# Patient Record
Sex: FEMALE | Race: WHITE | NOT HISPANIC OR LATINO | URBAN - METROPOLITAN AREA
[De-identification: names, ages, dates, MRNs, and addresses within clinical notes are randomized per-mention and may not be internally consistent; named-entity substitution may affect disease eponyms.]

---

## 2013-03-12 RX ORDER — TRIAMCINOLONE ACETONIDE 1 MG/G
CREAM TOPICAL
Qty: 454 | Refills: 6
Start: 2013-03-12

## 2019-11-12 ENCOUNTER — COMPLETE SKIN EXAM (OUTPATIENT)
Dept: URBAN - METROPOLITAN AREA CLINIC 18 | Facility: CLINIC | Age: 74
Setting detail: DERMATOLOGY
End: 2019-11-12

## 2019-11-12 DIAGNOSIS — C44.42 SQUAMOUS CELL CARCINOMA OF SKIN OF SCALP AND NECK: ICD-10-CM

## 2019-11-12 PROBLEM — L82.1 OTHER SEBORRHEIC KERATOSIS: Status: RESOLVED | Noted: 2019-11-12

## 2019-11-12 PROBLEM — L85.3 XEROSIS CUTIS: Status: RESOLVED | Noted: 2019-11-12

## 2019-11-12 PROCEDURE — 99213 OFFICE O/P EST LOW 20 MIN: CPT

## 2020-09-21 ENCOUNTER — FOLLOW-UP (OUTPATIENT)
Dept: URBAN - METROPOLITAN AREA CLINIC 18 | Facility: CLINIC | Age: 75
Setting detail: DERMATOLOGY
End: 2020-09-21

## 2020-09-21 DIAGNOSIS — L57.0 ACTINIC KERATOSIS: ICD-10-CM

## 2020-09-21 PROBLEM — L85.3 XEROSIS CUTIS: Status: RESOLVED | Noted: 2020-09-21

## 2020-09-21 PROBLEM — L82.1 OTHER SEBORRHEIC KERATOSIS: Status: RESOLVED | Noted: 2020-09-21

## 2020-09-21 PROCEDURE — 99213 OFFICE O/P EST LOW 20 MIN: CPT

## 2021-03-08 ENCOUNTER — OTHER (OUTPATIENT)
Dept: URBAN - METROPOLITAN AREA CLINIC 18 | Facility: CLINIC | Age: 76
Setting detail: DERMATOLOGY
End: 2021-03-08

## 2021-03-08 ENCOUNTER — RX ONLY (RX ONLY)
Age: 76
End: 2021-03-08

## 2021-03-08 DIAGNOSIS — L70.0 ACNE VULGARIS: ICD-10-CM

## 2021-03-08 PROBLEM — L30.9 DERMATITIS, UNSPECIFIED: Status: RESOLVED | Noted: 2021-03-08

## 2021-03-08 PROCEDURE — 99214 OFFICE O/P EST MOD 30 MIN: CPT

## 2021-03-08 RX ORDER — TRIAMCINOLONE ACETONIDE 1 MG/G
AS DIRECTED CREAM TOPICAL TWICE A DAY
Qty: 454 | Refills: 0
Start: 2021-03-08

## 2021-04-29 ENCOUNTER — OFFICE (OUTPATIENT)
Dept: URBAN - METROPOLITAN AREA CLINIC 107 | Facility: CLINIC | Age: 76
End: 2021-04-29
Payer: OTHER GOVERNMENT

## 2021-04-29 VITALS
HEIGHT: 59 IN | DIASTOLIC BLOOD PRESSURE: 66 MMHG | SYSTOLIC BLOOD PRESSURE: 150 MMHG | WEIGHT: 245 LBS | HEART RATE: 75 BPM

## 2021-04-29 DIAGNOSIS — E66.01 MORBID (SEVERE) OBESITY DUE TO EXCESS CALORIES: ICD-10-CM

## 2021-04-29 DIAGNOSIS — R19.7 DIARRHEA, UNSPECIFIED: ICD-10-CM

## 2021-04-29 DIAGNOSIS — D64.9 ANEMIA, UNSPECIFIED: ICD-10-CM

## 2021-04-29 PROCEDURE — 99214 OFFICE O/P EST MOD 30 MIN: CPT | Performed by: INTERNAL MEDICINE

## 2021-04-29 NOTE — SERVICEHPINOTES
Davey Croft   is seen today for a follow-up visit.     76 year old who has been followed by [facility] and Dr. Bruner for anemia, avm, history of C diff (s/p fecal transplant) and chronic diarrhea She has multiple medical problems including heart failure, atrial fibrillation on Apixaban, status post aortic valve replacement with bovine value, sleep apnea and very severe obesity with BMI of 50. She also has stage III chronic kidney disease.  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  She was admitted 2019 for a CHF exacerbation. THe day prior she required a blood transfusion and her post transfusions Hgb was 9.1.  She was noted to have poor compliance to medications including stopping her oral lasix 2 months prior.  She also noted to be on oral vanco for C diff.  Daisy saw an NP at the ID office and is due to see the ID doctor next month.  SHe has been treated for C diff off and on since august and with vanco and dificid.  She has done the tapering course of vancomycin that last 6 weeks.  SHe feels like she was really compliant with those medications.  She has diarrhea.  She has it 2-3 times.  There is no blood in the stool.  She has a lot of arnold-anal irritation.   I reviewed notes from the iD NP and in 10/10/2019 she had a positive c diff and was treated wtih dificid for 10 days followed by Vanco TID for with weeks.Daisy drinks squirt once or twice a week.  She does drink sugar free lemonade (great value) daily.  SHe drinks occasional milk, cream cheese daily.  SHe has not required a blood transfusions in a year then required one in 2019.  She has not had any black tarry stools, no blood in the stools.  SHe does have some hemorrhoids that occasionally had specks of bright blood with wiping with the diarrhea. SHe was noted to have dropping Hgb again in january and I had her admitted for transfusion.She does not have abdominal pain except with cramping before going to the bathroom.Daisy does not have heartburn.  Daisy was noted to have very low hgb (on admission it was 5.9) so we decided to admit her to the hospital and transfuse her and do additional work up. enteroscopy was unrevealing.  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  her C diff was negative and I decided to do a stool transplant given recurrent C diff.  She had that done.She was seen by cardiology and it was decided to hold her eliquis for paroxysmal afib till she saw her cardiologist.  Her hgbn on discharged was 9.4.  Diarrhea improved  She has not been on the eliquis since being in the hospital.  SHe saw her doctor Thursday afternoon and her hgb was 9.  She doesn't want to go back on it.  She is due to see Dr. Martinez on .  She hasn't taken any iron or anything  Plan from last visit:BR- I would like to wait till mid February to treat your H. Pylori.  i have put in the prescription now.  You will take two antibiotics for 10 days as well as omeprazole twice a day for 10 days.  It is very important to take the omeprazole while you are on the antibiotics.  you can stop it after you completeBR- stool test for C diff todayBR- I suspect that the diarrhea you had today was from coffe and lactose (cream and cream cheese).   Avoiding heavy lactose and artificial sweeteners is needed to help diarrhea going forward.BR- If you decide to start back on anticoagulation (which you may need) then we will check you blood counts a week after restart.  If they drop then we may need to consider double balloon enteroscop.  You can not get wireless capsule endoscopy due to pacemakerBR- If you don't restart anticoag then repeat blood counts in one month either here or with DR. PakBR- you should start back on some iron to improve blood counts.BRInterval History:  2021  She is getting a puppy mix today. BRHer son  of pancreatic cancer. Daisy is having some recurrence of symptoms.  BRA lot of things will cause diarrhea, chocolate in any form, OJ, Cranberry juice, salads (doesn't matter the kind of dressing), sweets.  She is having diarrhea once a day.  She has experimented with a lactose free diet and she went back on dairy and has done OK with it.Daisy is using monk fruit sweetener every morning in tea daily.BRNo abdominal pain.  Daisy does feel like she has to have a BM all the time, she doesn't trust passing gasBRShe has had some fecal incontinenceBRlast week her blood counts were 11.1      My nurse has reviewed and updated the medication list with the patient (medication reconciliation). I have also reviewed the medication list. New updates were made to the patient's medical, social and family history. Pertinent details are also noted above in the HPI.BR

## 2021-04-29 NOTE — SERVICENOTES
Our goal is to partner with you to improve your health and well being. It is important for you to complete necessary testing and follow the instructions given to you at your clinic visit. Our office will call you within 2 weeks with results of any testing but you may also call sooner to obtain results [phone].   If you have any questions or concerns please feel free to call.  We take your care very seriously and we thank you for your trust!
- stool test for C diff and inflammation
-Start psyllium fiber (brand examples are Metamucil and konsyl).  Do not get the one with artificial sweeteners (ie don't get Metamucil Sugar Free Orange).  Start with 1/2 dose and increase to full dose as tolerated.  If this causes too much gas, then please let me know.
- if the psyllium is not working after a week or two  then start immodium (2 mg loperamide over the counter) once a day in the morning to see if this controls diarrhea.  If this causes constipation then try something else
- Try to stick with a breakfast and lunch that are about 300-calories a day.  and 1-2 100 calorie snacks and a dinner that is a lean protein with vegetables and a small amount of grain

## 2021-05-07 LAB
C DIFFICILE TOXINS A+B, EIA: NEGATIVE
CALPROTECTIN, FECAL: 81 UG/G (ref 0–120)

## 2021-12-22 ENCOUNTER — OFFICE (OUTPATIENT)
Dept: URBAN - METROPOLITAN AREA CLINIC 72 | Facility: CLINIC | Age: 76
End: 2021-12-22
Payer: OTHER GOVERNMENT

## 2021-12-22 VITALS
HEART RATE: 150 BPM | DIASTOLIC BLOOD PRESSURE: 64 MMHG | SYSTOLIC BLOOD PRESSURE: 126 MMHG | WEIGHT: 247 LBS | HEIGHT: 59 IN

## 2021-12-22 DIAGNOSIS — K52.9 NONINFECTIVE GASTROENTERITIS AND COLITIS, UNSPECIFIED: ICD-10-CM

## 2021-12-22 DIAGNOSIS — D64.9 ANEMIA, UNSPECIFIED: ICD-10-CM

## 2021-12-22 PROCEDURE — 99213 OFFICE O/P EST LOW 20 MIN: CPT | Performed by: INTERNAL MEDICINE

## 2021-12-22 NOTE — SERVICEHPINOTES
Davey Croft   is seen today for a follow-up visit.  
br
br76 year old who has been followed by [facility] and Dr. Bruner for anemia, avm, history of C diff (s/p fecal transplant) and chronic diarrheaShe has multiple medical problems including heart failure, atrial fibrillation on Apixaban, status post aortic valve replacement with bovine value, sleep apnea and very severe obesity with BMI of 50. She also has stage III chronic kidney disease. 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 She was admitted 2019 for a CHF exacerbation. THe day prior she required a blood transfusion and her post transfusions Hgb was 9.1. She was noted to have poor compliance to medications including stopping her oral lasix 2 months prior. She also noted to be on oral vanco for C diff. Shalom saw an NP at the ID office and is due to see the ID doctor next month. SHe has been treated for C diff off and on since august and with vanco and dificid. She has done the tapering course of vancomycin that last 6 weeks. SHe feels like she was really compliant with those medications. She has diarrhea. She has it 2-3 times. There is no blood in the stool. She has a lot of arnold-anal irritation. I reviewed notes from the iD NP and in 10/10/2019 she had a positive c diff and was treated wtih dificid for 10 days followed by Vanco TID for with weeks.Shalom drinks squirt once or twice a week. She does drink sugar free lemonade (great value) daily. SHe drinks occasional milk, cream cheese daily. SHe has not required a blood transfusions in a year then required one in 2019. She has not had any black tarry stools, no blood in the stools. SHe does have some hemorrhoids that occasionally had specks of bright blood with wiping with the diarrhea. SHe was noted to have dropping Hgb again in january and I had her admitted for transfusion.She does not have abdominal pain except with cramping before going to the bathroom.Shalom does not have heartburn. Shalom was noted to have very low hgb (on admission it was 5.9) so we decided to admit her to the hospital and transfuse her and do additional work up. enteroscopy was unrevealing. 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 her C diff was negative and I decided to do a stool transplant given recurrent C diff. She had that done.She was seen by cardiology and it was decided to hold her eliquis for paroxysmal afib till she saw her cardiologist. Her hgbn on discharged was 9.4. Diarrhea improved She has not been on the eliquis since being in the hospital. SHe saw her doctor Thursday afternoon and her hgb was 9. She doesn't want to go back on it. She is due to see Dr. Martinez on . She hasn't taken any iron or anythingInterval History:medhat is getting a puppy mix today. alexsandraHer son  of pancreatic cancer.Shalom is having some recurrence of symptoms. brA lot of things will cause diarrhea, chocolate in any form, OJ, Cranberry juice, salads (doesn't matter the kind of dressing), sweets. She is having diarrhea once a day. She has experimented with a lactose free diet and she went back on dairy and has done OK with it.Shalom is using monk fruit sweetener every morning in tea daily.brNo abdominal pain. Shalom does feel like she has to have a BM all the time, she doesn't trust passing gasbrShe has had some fecal incontinencebrlast week her blood counts were 11.1    br  Plan from last visit:
br
br- stool test for C diff and inflammationbr-Start psyllium fiber (brand examples are Metamucil and konsyl). Do not get the one with artificial sweeteners (ie don't get Metamucil Sugar Free Orange). Start with 1/2 dose and increase to full dose as tolerated. If this causes too much gas, then please let me know.br- if the psyllium is not working after a week or two then start immodium (2 mg loperamide over the counter) once a day in the morning to see if this controls diarrhea. If this causes constipation then try something elsebr- Try to stick with a breakfast and lunch that are about 300-calories a day. and 1-2 100 calorie snacks and a dinner that is a lean protein with vegetables and a small amount of grain Interval History:  2021   
br   2021?C diff neg
br FC 81
br Nov 2 she was having a lot of trouble breathing she went to the hospital and was found to have COVID. She did not get blood in the hospital  She went to [facility] after that. She was taken off her iron capsules and she got anemic while in rehab. they check her stool  brshyanne got called from [facility] 2021 for blood in stool it was not visible it was microscopic. 
br She was then sent to the hospital for a blood transfusion.  
br She has had no black or tarry stools. 
br She didnt do any GI procedures.  SHe had a blood transfusion in the outpatient clinic. 
Holdenmedhat has been back to see Dr. Ceja office since that time.  
br This last monday it was 8 and then week before it was 7. 
br She started back on her routine. 
br SHe is not currently on any blood thinners. 
Sal also gave her an aranesp shot. 
br Her energy is very low. 
br The covid messed with her breathing. Rosa nurse has reviewed and updated the medication list with the patient (medication reconciliation). I have also reviewed the medication list. New updates were made to the patient's medical, social and family history. Pertinent details are also noted above in the HPI.br visited="true"

## 2021-12-22 NOTE — SERVICENOTES
Our goal is to partner with you to improve your health and well being. It is important for you to complete necessary testing and follow the instructions given to you at your clinic visit. Our office will call you within 2 weeks with results of any testing but you may also call sooner to obtain results [phone].   If you have any questions or concerns please feel free to call.  We take your care very seriously and we thank you for your trust!
- try taking 100 mg to 250 mg of vitamin C with your iron
, Follow up as needed if symptoms are not improving or you have new or concerning symptoms.

## 2022-03-10 ENCOUNTER — SPOT (OUTPATIENT)
Dept: URBAN - METROPOLITAN AREA CLINIC 18 | Facility: CLINIC | Age: 77
Setting detail: DERMATOLOGY
End: 2022-03-10

## 2022-03-10 DIAGNOSIS — C44.612 BASAL CELL CARCINOMA OF SKIN OF RIGHT UPPER LIMB, INCLUDING SHOULDER: ICD-10-CM

## 2022-03-10 PROBLEM — D18.01 HEMANGIOMA OF SKIN AND SUBCUTANEOUS TISSUE: Status: RESOLVED | Noted: 2022-03-10

## 2022-03-10 PROBLEM — L82.1 OTHER SEBORRHEIC KERATOSIS: Status: RESOLVED | Noted: 2022-03-10

## 2022-03-10 PROCEDURE — 99213 OFFICE O/P EST LOW 20 MIN: CPT

## 2022-09-22 ENCOUNTER — OFFICE (OUTPATIENT)
Dept: URBAN - METROPOLITAN AREA CLINIC 84 | Facility: CLINIC | Age: 77
End: 2022-09-22
Payer: OTHER GOVERNMENT

## 2022-09-22 VITALS
WEIGHT: 238 LBS | DIASTOLIC BLOOD PRESSURE: 60 MMHG | SYSTOLIC BLOOD PRESSURE: 102 MMHG | HEART RATE: 85 BPM | HEIGHT: 59 IN

## 2022-09-22 DIAGNOSIS — K55.20 ANGIODYSPLASIA OF COLON WITHOUT HEMORRHAGE: ICD-10-CM

## 2022-09-22 DIAGNOSIS — R19.5 OTHER FECAL ABNORMALITIES: ICD-10-CM

## 2022-09-22 DIAGNOSIS — K22.70 BARRETT'S ESOPHAGUS WITHOUT DYSPLASIA: ICD-10-CM

## 2022-09-22 DIAGNOSIS — D64.9 ANEMIA, UNSPECIFIED: ICD-10-CM

## 2022-09-22 PROCEDURE — 99215 OFFICE O/P EST HI 40 MIN: CPT | Performed by: NURSE PRACTITIONER

## 2022-09-28 ENCOUNTER — APPOINTMENT (OUTPATIENT)
Dept: URBAN - METROPOLITAN AREA SURGERY 11 | Age: 77
Setting detail: DERMATOLOGY
End: 2022-09-28

## 2022-09-28 DIAGNOSIS — L20.89 OTHER ATOPIC DERMATITIS: ICD-10-CM

## 2022-09-28 DIAGNOSIS — I87.2 VENOUS INSUFFICIENCY (CHRONIC) (PERIPHERAL): ICD-10-CM

## 2022-09-28 PROCEDURE — OTHER PRESCRIPTION MEDICATION MANAGEMENT: OTHER

## 2022-09-28 PROCEDURE — OTHER PRESCRIPTION: OTHER

## 2022-09-28 PROCEDURE — 99214 OFFICE O/P EST MOD 30 MIN: CPT

## 2022-09-28 PROCEDURE — OTHER COUNSELING: OTHER

## 2022-09-28 RX ORDER — CLOBETASOL PROPIONATE 0.5 MG/G
OINTMENT TOPICAL
Qty: 60 | Refills: 3 | Status: ERX | COMMUNITY
Start: 2022-09-28

## 2022-09-28 ASSESSMENT — LOCATION DETAILED DESCRIPTION DERM
LOCATION DETAILED: LEFT RADIAL DORSAL HAND
LOCATION DETAILED: RIGHT DISTAL PRETIBIAL REGION
LOCATION DETAILED: LEFT DISTAL PRETIBIAL REGION
LOCATION DETAILED: RIGHT ULNAR DORSAL HAND

## 2022-09-28 ASSESSMENT — LOCATION SIMPLE DESCRIPTION DERM
LOCATION SIMPLE: RIGHT HAND
LOCATION SIMPLE: LEFT PRETIBIAL REGION
LOCATION SIMPLE: RIGHT PRETIBIAL REGION
LOCATION SIMPLE: LEFT HAND

## 2022-09-28 ASSESSMENT — LOCATION ZONE DERM
LOCATION ZONE: HAND
LOCATION ZONE: LEG

## 2022-09-28 NOTE — HPI: SKIN LESION
What Type Of Note Output Would You Prefer (Optional)?: Standard Output
How Severe Is Your Skin Lesion?: mild
Has Your Skin Lesion Been Treated?: been treated
Is This A New Presentation, Or A Follow-Up?: Skin Lesions
Additional History: Patient reports using CeraVe moisturizing cream and Phytoplex Hydroguard Silicone Cream with no favorable results.

## 2022-09-28 NOTE — PROCEDURE: PRESCRIPTION MEDICATION MANAGEMENT
Render In Strict Bullet Format?: No
Initiate Treatment: Clobetasol .05% Ointment to hands BID x 2 weeks for flares and then no more than 3x a week.
Detail Level: Simple

## 2022-11-30 ENCOUNTER — APPOINTMENT (OUTPATIENT)
Dept: URBAN - METROPOLITAN AREA SURGERY 11 | Age: 77
Setting detail: DERMATOLOGY
End: 2022-11-30

## 2022-11-30 DIAGNOSIS — I87.2 VENOUS INSUFFICIENCY (CHRONIC) (PERIPHERAL): ICD-10-CM

## 2022-11-30 DIAGNOSIS — L20.89 OTHER ATOPIC DERMATITIS: ICD-10-CM

## 2022-11-30 PROCEDURE — OTHER PRESCRIPTION MEDICATION MANAGEMENT: OTHER

## 2022-11-30 PROCEDURE — 99214 OFFICE O/P EST MOD 30 MIN: CPT

## 2022-11-30 PROCEDURE — OTHER COUNSELING: OTHER

## 2022-11-30 ASSESSMENT — LOCATION ZONE DERM
LOCATION ZONE: HAND
LOCATION ZONE: LEG

## 2022-11-30 ASSESSMENT — LOCATION DETAILED DESCRIPTION DERM
LOCATION DETAILED: RIGHT DISTAL PRETIBIAL REGION
LOCATION DETAILED: LEFT RADIAL DORSAL HAND
LOCATION DETAILED: LEFT DISTAL PRETIBIAL REGION
LOCATION DETAILED: RIGHT RADIAL DORSAL HAND

## 2022-11-30 ASSESSMENT — LOCATION SIMPLE DESCRIPTION DERM
LOCATION SIMPLE: LEFT PRETIBIAL REGION
LOCATION SIMPLE: LEFT HAND
LOCATION SIMPLE: RIGHT PRETIBIAL REGION
LOCATION SIMPLE: RIGHT HAND

## 2022-11-30 NOTE — PROCEDURE: PRESCRIPTION MEDICATION MANAGEMENT
Detail Level: Zone
Render In Strict Bullet Format?: No
Continue Regimen: Clobetasol BID PRN no more than 3-4 days a week for flares only
Continue Regimen: Clobetasol ointment BID PRN for flares only

## 2023-07-06 ENCOUNTER — OFFICE (OUTPATIENT)
Dept: URBAN - METROPOLITAN AREA CLINIC 72 | Facility: CLINIC | Age: 78
End: 2023-07-06
Payer: OTHER GOVERNMENT

## 2023-07-06 VITALS
HEIGHT: 59 IN | DIASTOLIC BLOOD PRESSURE: 80 MMHG | HEART RATE: 100 BPM | WEIGHT: 238 LBS | SYSTOLIC BLOOD PRESSURE: 140 MMHG | OXYGEN SATURATION: 96 %

## 2023-07-06 DIAGNOSIS — I48.91 UNSPECIFIED ATRIAL FIBRILLATION: ICD-10-CM

## 2023-07-06 DIAGNOSIS — D50.9 IRON DEFICIENCY ANEMIA, UNSPECIFIED: ICD-10-CM

## 2023-07-06 DIAGNOSIS — I50.9 HEART FAILURE, UNSPECIFIED: ICD-10-CM

## 2023-07-06 DIAGNOSIS — K22.70 BARRETT'S ESOPHAGUS WITHOUT DYSPLASIA: ICD-10-CM

## 2023-07-06 DIAGNOSIS — Z95.2 PRESENCE OF PROSTHETIC HEART VALVE: ICD-10-CM

## 2023-07-06 DIAGNOSIS — K92.1 MELENA: ICD-10-CM

## 2023-07-06 DIAGNOSIS — K55.20 ANGIODYSPLASIA OF COLON WITHOUT HEMORRHAGE: ICD-10-CM

## 2023-07-06 PROCEDURE — 99214 OFFICE O/P EST MOD 30 MIN: CPT | Performed by: NURSE PRACTITIONER

## 2023-07-17 ENCOUNTER — OFFICE (OUTPATIENT)
Dept: URBAN - METROPOLITAN AREA CLINIC 67 | Facility: CLINIC | Age: 78
End: 2023-07-17
Payer: OTHER GOVERNMENT

## 2023-07-17 ENCOUNTER — OFFICE (OUTPATIENT)
Dept: URBAN - METROPOLITAN AREA CLINIC 67 | Facility: CLINIC | Age: 78
End: 2023-07-17

## 2023-07-17 VITALS — HEIGHT: 59 IN

## 2023-07-17 DIAGNOSIS — D50.9 IRON DEFICIENCY ANEMIA, UNSPECIFIED: ICD-10-CM

## 2023-07-17 PROCEDURE — 91110 GI TRC IMG INTRAL ESOPH-ILE: CPT | Performed by: SPECIALIST

## 2023-07-17 NOTE — SERVICENOTES
I reviewed the capsule consent form with patient, she read and signed the consent form.  I did review her instructions for the remainder of the day with her and she did not have any questions.  Capsule was swallowed with out any difficulty.

## 2023-09-19 ENCOUNTER — OFFICE (OUTPATIENT)
Dept: URBAN - METROPOLITAN AREA CLINIC 72 | Facility: CLINIC | Age: 78
End: 2023-09-19
Payer: OTHER GOVERNMENT

## 2023-09-19 VITALS
HEART RATE: 96 BPM | SYSTOLIC BLOOD PRESSURE: 130 MMHG | DIASTOLIC BLOOD PRESSURE: 80 MMHG | HEIGHT: 59 IN | WEIGHT: 228 LBS

## 2023-09-19 DIAGNOSIS — K55.20 ANGIODYSPLASIA OF COLON WITHOUT HEMORRHAGE: ICD-10-CM

## 2023-09-19 DIAGNOSIS — E66.01 MORBID (SEVERE) OBESITY DUE TO EXCESS CALORIES: ICD-10-CM

## 2023-09-19 DIAGNOSIS — I50.9 HEART FAILURE, UNSPECIFIED: ICD-10-CM

## 2023-09-19 DIAGNOSIS — I48.91 UNSPECIFIED ATRIAL FIBRILLATION: ICD-10-CM

## 2023-09-19 DIAGNOSIS — K22.70 BARRETT'S ESOPHAGUS WITHOUT DYSPLASIA: ICD-10-CM

## 2023-09-19 DIAGNOSIS — D50.9 IRON DEFICIENCY ANEMIA, UNSPECIFIED: ICD-10-CM

## 2023-09-19 PROCEDURE — 99214 OFFICE O/P EST MOD 30 MIN: CPT | Performed by: INTERNAL MEDICINE

## 2023-09-19 RX ORDER — PANTOPRAZOLE SODIUM 20 MG/1
TABLET, DELAYED RELEASE ORAL
Qty: 60 | Refills: 3 | Status: COMPLETED
Start: 2023-09-19 | End: 2024-07-29

## 2023-09-19 NOTE — SERVICEHPINOTES
Davey Croft   is seen today for a follow-up visit.  
alexsandra Croft is seen today for a follow-up visit. 78 year old who has been followed by [facility] and Dr. Bruner for anemia, avm, history of C diff (s/p fecal transplant) and chronic diarrheaShneda has multiple medical problems including heart failure, atrial fibrillation on Apixaban, status post aortic valve replacement with bovine value, sleep apnea and very severe obesity with BMI of 50. 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 THe day prior she required a blood transfusion and her post transfusions Hgb was 9.1. She was noted to have poor compliance to medications including stopping her oral lasix 2 months prior. She also noted to be on oral vanco for C diff.Shalom saw an NP at the ID office and is due to see the ID doctor next month. SHe has been treated for C diff off and on since august and with vanco and dificid. She has done the tapering course of vancomycin that last 6 weeks. SHe feels like she was really compliant with those medications. She has diarrhea. She has it 2-3 times. There is no blood in the stool. She has a lot of arnold-anal irritation. I reviewed notes from the iD NP and in 10/10/2019 she had a positive c diff and was treated wtih dificid for 10 days followed by Vanco TID for with weeks.Shalom drinks squirt once or twice a week. She does drink sugar free lemonade (great value) daily. SHe drinks occasional milk, cream cheese daily.SHe has not required a blood transfusions in a year then required one in 2019. She has not had any black tarry stools, no blood in the stools. SHe does have some hemorrhoids that occasionally had specks of bright blood with wiping with the diarrhea. SHe was noted to have dropping Hgb again in january and I had her admitted for transfusion.Shalom does not have abdominal pain except with cramping before going to the bathroom.Shalom does not have heartburn.Shalom was noted to have very low hgb (on admission it was 5.9) so we decided to admit her to the hospital and transfuse her and do additional work up. enteroscopy was unrevealing. 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 She had that done.Shalom was seen by cardiology and it was decided to hold her eliquis for paroxysmal afib till she saw her cardiologist. Her hgbn on discharged was 9.4.Selena Martin has not been on the eliquis since being in the hospital. SHe saw her doctor Thursday afternoon and her hgb was 9. She doesn't want to go back on it. She is due to see Dr. Martinez on . She hasn't taken any iron or anythingInterval History:medhat is getting a puppy mix today.Mickie son  of pancreatic cancer.Shalom is having some recurrence of symptoms.brA lot of things will cause diarrhea, chocolate in any form, OJ, Cranberry juice, salads (doesn't matter the kind of dressing), sweets. She is having diarrhea once a day. She has experimented with a lactose free diet and she went back on dairy and has done OK with it.Shalom is using monk fruit sweetener every morning in tea daily.alexsandraNo abdominal pain.Shalom does feel like she has to have a BM all the time, she doesn't trust passing gasbrShneda has had some fecal incontinencebrlast week her blood counts were 11.1brInterval History:1br2021?C diff negbrFC 81brNov 2 she was having a lot of trouble breathing she went to the hospital and was found to have COVID. She did not get blood in the hospital She went to [facility] after that. She was taken off her iron capsules and she got anemic while in rehab. they check her stoolbrwe got called from [facility] 2021 for blood in stool it was not visible it was microscopic.Shalom was then sent to the hospital for a blood transfusion.Shalom has had no black or tarry stools.Shalom didnt do any GI procedures. SHe had a blood transfusion in the outpatient clinic.Shalom has been back to see Dr. Ceja office since that time.brThis last monday it was 8 and then week before it was 7.Shalom started back on her routine.Shalom is not currently on any blood thinners.Sal also gave her an aranesp shot.Mickie energy is very low.Luda covid messed with her breathing.brInterval history, rsmedhat has been overall doing well and stable. She had an episode of right upper quadrant pain and was seen at [facility] ER 2022. CT abdomen and pelvis without contrast revealed cholelithiasis, nephrolithiasis and diverticulosis, no acute findings. Her hemoglobin was 8.9. When she had her labs done at Dr. Ceja office in September her hemoglobin had dropped slightly to 7.6 from 8.7. 1 month ago she states she saw a black stool on 2 occasions. She's not seen any further black stool. She sees no red blood. She has no abdominal pain, heartburn, nausea vomiting, weight loss or bowel changes.. She states she did get 2 units of packed red cells about 2 weeks ago and an iron infusion last week. She denies NSAID usage. She is not on anticoagulation.brPlanbr- schedule capsule endoscopyInterval history, rSmedhat did not complete capsule endoscopy. her hemoglobin dropped again to 6.1. She was transfused 2 units on 2023. She reports her hemoglobin on 7/3/2023 was 8. 10 days ago she had melena for 2 days. Now she has occasional black stool when she wipes. She has a bowel movement every day. Some days she has the urge to go all day long. Other days she strains and cannot have a bowel movement. Her stool is not hard. She's occasional gas. She denies abdominal pain, nausea, vomiting, heartburn, weight loss.Shalom saw Dr. Martinez yesterday states her CHF is stable.
br SHe picked up the medication for the coloscopy.     br  Plan from last visit:
brcapsuleInterval History:  2023   
br
br   She had capsule which showed multiple avm. 
br She saw her hematologist 3 weeks ago and her Hgb was 6.7 and she had to be sent to the hospital for a blood transfusioj.  They started her on pantoprazole. 
br Sweet things will give her diarrhea. stools tend towards loose.  No black stool .  Her stool is generally brown.  
br My nurse has reviewed and updated the medication list with the patient (medication reconciliation). I have also reviewed the medication list. New updates were made to the patient's medical, social and family history. Pertinent details are also noted above in the HPI.br visited="true"

## 2023-09-19 NOTE — SERVICENOTES
Our goal is to partner with you to improve your health and well being. It is important for you to complete necessary testing and follow the instructions given to you at your clinic visit. Our office will call you within 2 weeks with results of any testing but you may also call sooner to obtain results [phone].   If you have any questions or concerns please feel free to call.  We take your care very seriously and we thank you for your trust!
- decrease the pantoprazol to 20mg.  We may have you stop it after we do the procedures
- I will get Enteroscopy and colonoscopy at [facility].  You will hear from us to schedule.  If you don't hear from us in the next week please call. 
- you are to get  amoxicillin 2 grams po 30-60 minutes before the procedure.  we can give you that at the time of the procedure. 
- Follow up after the procedures.

## 2024-01-24 ENCOUNTER — OFFICE (OUTPATIENT)
Dept: URBAN - METROPOLITAN AREA CLINIC 72 | Facility: CLINIC | Age: 79
End: 2024-01-24
Payer: OTHER GOVERNMENT

## 2024-01-24 VITALS
SYSTOLIC BLOOD PRESSURE: 132 MMHG | HEIGHT: 59 IN | RESPIRATION RATE: 14 BRPM | HEART RATE: 115 BPM | WEIGHT: 210 LBS | DIASTOLIC BLOOD PRESSURE: 78 MMHG

## 2024-01-24 DIAGNOSIS — G47.30 SLEEP APNEA, UNSPECIFIED: ICD-10-CM

## 2024-01-24 DIAGNOSIS — Z95.2 PRESENCE OF PROSTHETIC HEART VALVE: ICD-10-CM

## 2024-01-24 DIAGNOSIS — K55.20 ANGIODYSPLASIA OF COLON WITHOUT HEMORRHAGE: ICD-10-CM

## 2024-01-24 DIAGNOSIS — D64.9 ANEMIA, UNSPECIFIED: ICD-10-CM

## 2024-01-24 DIAGNOSIS — E66.01 MORBID (SEVERE) OBESITY DUE TO EXCESS CALORIES: ICD-10-CM

## 2024-01-24 DIAGNOSIS — D50.9 IRON DEFICIENCY ANEMIA, UNSPECIFIED: ICD-10-CM

## 2024-01-24 PROCEDURE — 99214 OFFICE O/P EST MOD 30 MIN: CPT | Performed by: INTERNAL MEDICINE

## 2024-01-24 RX ORDER — POLYETHYLENE GLYCOL 3350, SODIUM SULFATE, SODIUM CHLORIDE, POTASSIUM CHLORIDE, ASCORBIC ACID, SODIUM ASCORBATE 7.5-2.691G
KIT ORAL
Qty: 1 | Refills: 0 | Status: COMPLETED
Start: 2024-01-24 | End: 2024-07-29

## 2024-01-24 NOTE — SERVICENOTES
Our goal is to partner with you to improve your health and well being. It is important for you to complete necessary testing and follow the instructions given to you at your clinic visit. Our office will call you within 2 weeks with results of any testing but you may also call sooner to obtain results [phone].   If you have any questions or concerns please feel free to call.  We take your care very seriously and we thank you for your trust!
- schedule push enteroscopy and colonoscopy at [facility].  You will need to get antibiotics prior to the procedure
- continue follow up with [facility]
- follow up after procedures

## 2024-01-24 NOTE — SERVICEHPINOTES
Davey Croft   is seen today for a follow-up visit.  
alexsandra Croft is seen today for a follow-up visit. 78 year old who has been followed by [facility] and Dr. Bruner for anemia, avm, history of C diff (s/p fecal transplant) and chronic diarrheaShneda has multiple medical problems including heart failure, atrial fibrillation on Apixaban, status post aortic valve replacement with bovine value, sleep apnea and very severe obesity with BMI of 50. 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 THe day prior she required a blood transfusion and her post transfusions Hgb was 9.1. She was noted to have poor compliance to medications including stopping her oral lasix 2 months prior. She also noted to be on oral vanco for C diff.Shalom saw an NP at the ID office and is due to see the ID doctor next month. SHe has been treated for C diff off and on since august and with vanco and dificid. She has done the tapering course of vancomycin that last 6 weeks. SHe feels like she was really compliant with those medications. She has diarrhea. She has it 2-3 times. There is no blood in the stool. She has a lot of arnold-anal irritation. I reviewed notes from the [facility] NP and in 10/10/2019 she had a positive c diff and was treated wtih dificid for 10 days followed by Vanco TID for with weeks.Shalom drinks squirt once or twice a week. She does drink sugar free lemonade (great value) daily. SHe drinks occasional milk, cream cheese daily.SHe has not required a blood transfusions in a year then required one in 2019. She has not had any black tarry stools, no blood in the stools. SHe does have some hemorrhoids that occasionally had specks of bright blood with wiping with the diarrhea. SHe was noted to have dropping Hgb again in january and I had her admitted for transfusion.Shalom does not have abdominal pain except with cramping before going to the bathroom.Shalom does not have heartburn.Shalom was noted to have very low hgb (on admission it was 5.9) so we decided to admit her to the hospital and transfuse her and do additional work up. enteroscopy was unrevealing. 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 She had that done.Shalom was seen by cardiology and it was decided to hold her eliquis for paroxysmal afib till she saw her cardiologist. Her hgbn on discharged was 9.4.Selena Martin has not been on the eliquis since being in the hospital. SHe saw her doctor Thursday afternoon and her hgb was 9. She doesn't want to go back on it. She is due to see Dr. Martinez on . She hasn't taken any iron or anythingInterval History:medhat is getting a puppy mix today.Mickie son  of pancreatic cancer.Shalom is having some recurrence of symptoms.brA lot of things will cause diarrhea, chocolate in any form, OJ, Cranberry juice, salads (doesn't matter the kind of dressing), sweets. She is having diarrhea once a day. She has experimented with a lactose free diet and she went back on dairy and has done OK with it.Shalom is using monk fruit sweetener every morning in tea daily.alexsandraNo abdominal pain.Shalom does feel like she has to have a BM all the time, she doesn't trust passing gasbrShneda has had some fecal incontinencebrlast week her blood counts were 11.1brInterval History:1br2021?C diff negbrFC 81brNov 2 she was having a lot of trouble breathing she went to the hospital and was found to have COVID. She did not get blood in the hospital She went to [facility] after that. She was taken off her iron capsules and she got anemic while in rehab. they check her stoolbrwe got called from [facility] 2021 for blood in stool it was not visible it was microscopic.Shalom was then sent to the hospital for a blood transfusion.Shalom has had no black or tarry stools.Shalom didnt do any GI procedures. SHe had a blood transfusion in the outpatient clinic.Shalom has been back to see Dr. Ceja office since that time.Iqras last monday it was 8 and then week before it was 7.Shalom started back on her routine.Shalom is not currently on any blood thinners.Sal also gave her an aranesp shot.Mickie energy is very low.brThe covid messed with her breathing.brInterval history, rsmedhat has been overall doing well and stable. She had an episode of right upper quadrant pain and was seen at [facility] ER 2022. CT abdomen and pelvis without contrast revealed cholelithiasis, nephrolithiasis and diverticulosis, no acute findings. Her hemoglobin was 8.9. When she had her labs done at Dr. Ceja office in September her hemoglobin had dropped slightly to 7.6 from 8.7. 1 month ago she states she saw a black stool on 2 occasions. She's not seen any further black stool. She sees no red blood. She has no abdominal pain, heartburn, nausea vomiting, weight loss or bowel changes.. She states she did get 2 units of packed red cells about 2 weeks ago and an iron infusion last week. She denies NSAID usage. She is not on anticoagulation.brInterval history, Colin did not complete capsule endoscopy. her hemoglobin dropped again to 6.1. She was transfused 2 units on 2023. She reports her hemoglobin on 7/3/2023 was 8. 10 days ago she had melena for 2 days. Now she has occasional black stool when she wipes. She has a bowel movement every day. Some days she has the urge to go all day long. Other days she strains and cannot have a bowel movement. Her stool is not hard. She's occasional gas. She denies abdominal pain, nausea, vomiting, heartburn, weight loss.Shalom saw Dr. Martinez yesterday states her CHF is stable.Shalom picked up the medication for the coloscopy. Interval History:2023Mara had capsule which showed multiple avm. Shalom saw her hematologist 3 weeks ago and her Hgb was 6.7 and she had to be sent to the hospital for a blood transfusioj. They started her on pantoprazole. brSweet things will give her diarrhea. stools tend towards loose. No black stool. Her stool is generally brown.     br  Plan from last visit:
br- decrease the pantoprazol to 20mg. We may have you stop it after we do the proceduresbr- I will get Enteroscopy and [facility] at [facility]. You will hear from us to schedule. If you don't hear from us in the next week please call. br- you are to get amoxicillin 2 grams po 30-60 minutes before the procedure. we can give you that at the time of the procedure. br- Follow up after the procedures. Interval History:  2024   
br   she was supposed to get the procedures but instead was admitted with pulmonary edema
br hgb on admission was 7.7 in december it was 9 at [facility]..she got aranesp and iron 
br She watches the stool carefully and does not see any black stool or blood in the stool.  No GI symtpoms. br She has chronic edema and cellucitis currently on keflex.  
br 
She takes lasix 60 mg in the morning and metalozone 5 mg a day. 
br She is on pantoprazole 20 mg once a day.  She would like to stop it if possible. br My nurse has reviewed and updated the medication list with the patient (medication reconciliation). I have also reviewed the medication list. New updates were made to the patient's medical, social and family history. Pertinent details are also noted above in the HPI.br visited="true"

## 2024-02-13 ENCOUNTER — ON CAMPUS - OUTPATIENT (OUTPATIENT)
Dept: URBAN - METROPOLITAN AREA HOSPITAL 79 | Facility: HOSPITAL | Age: 79
End: 2024-02-13
Payer: OTHER GOVERNMENT

## 2024-02-13 DIAGNOSIS — D50.9 IRON DEFICIENCY ANEMIA, UNSPECIFIED: ICD-10-CM

## 2024-02-13 DIAGNOSIS — K31.7 POLYP OF STOMACH AND DUODENUM: ICD-10-CM

## 2024-02-13 DIAGNOSIS — Q27.33 ARTERIOVENOUS MALFORMATION OF DIGESTIVE SYSTEM VESSEL: ICD-10-CM

## 2024-02-13 DIAGNOSIS — D12.3 BENIGN NEOPLASM OF TRANSVERSE COLON: ICD-10-CM

## 2024-02-13 DIAGNOSIS — D12.4 BENIGN NEOPLASM OF DESCENDING COLON: ICD-10-CM

## 2024-02-13 DIAGNOSIS — K31.89 OTHER DISEASES OF STOMACH AND DUODENUM: ICD-10-CM

## 2024-02-13 PROCEDURE — 43239 EGD BIOPSY SINGLE/MULTIPLE: CPT | Performed by: INTERNAL MEDICINE

## 2024-02-13 PROCEDURE — 45385 COLONOSCOPY W/LESION REMOVAL: CPT | Performed by: INTERNAL MEDICINE

## 2024-02-13 PROCEDURE — 43255 EGD CONTROL BLEEDING ANY: CPT | Mod: 59 | Performed by: INTERNAL MEDICINE

## 2024-07-29 ENCOUNTER — OFFICE (OUTPATIENT)
Dept: URBAN - METROPOLITAN AREA CLINIC 72 | Facility: CLINIC | Age: 79
End: 2024-07-29
Payer: OTHER GOVERNMENT

## 2024-07-29 VITALS
HEIGHT: 59 IN | SYSTOLIC BLOOD PRESSURE: 134 MMHG | DIASTOLIC BLOOD PRESSURE: 80 MMHG | HEART RATE: 85 BPM | OXYGEN SATURATION: 98 % | WEIGHT: 213 LBS

## 2024-07-29 DIAGNOSIS — Z79.2 LONG TERM (CURRENT) USE OF ANTIBIOTICS: ICD-10-CM

## 2024-07-29 DIAGNOSIS — D50.9 IRON DEFICIENCY ANEMIA, UNSPECIFIED: ICD-10-CM

## 2024-07-29 DIAGNOSIS — E66.01 MORBID (SEVERE) OBESITY DUE TO EXCESS CALORIES: ICD-10-CM

## 2024-07-29 DIAGNOSIS — Q27.30 ARTERIOVENOUS MALFORMATION, SITE UNSPECIFIED: ICD-10-CM

## 2024-07-29 DIAGNOSIS — K55.20 ANGIODYSPLASIA OF COLON WITHOUT HEMORRHAGE: ICD-10-CM

## 2024-07-29 DIAGNOSIS — Z95.2 PRESENCE OF PROSTHETIC HEART VALVE: ICD-10-CM

## 2024-07-29 DIAGNOSIS — I25.10 ATHEROSCLEROTIC HEART DISEASE OF NATIVE CORONARY ARTERY WITH: ICD-10-CM

## 2024-07-29 PROCEDURE — 99214 OFFICE O/P EST MOD 30 MIN: CPT | Performed by: INTERNAL MEDICINE

## 2024-07-29 NOTE — SERVICENOTES
Our goal is to partner with you to improve your health and well being. It is important for you to complete necessary testing and follow the instructions given to you at your clinic visit. Our office will call you within 2 weeks with results of any testing but you may also call sooner to obtain results [phone].   If you have any questions or concerns please feel free to call.  We take your care very seriously and we thank you for your trust!



Here are the key instructions and next steps we discussed:

- Follow-up Tests and Procedures:
  - Schedule an enteroscopy at [place] with Dr. Blake or Dr. Aleman to investigate and possibly treat additional AVMs in your small bowel. 
- you will need prophylactic antibiotics prior to your procedure given your heart valve replacement. we can give that at the time of the procedure

## 2025-03-27 ENCOUNTER — OFFICE (OUTPATIENT)
Dept: URBAN - METROPOLITAN AREA CLINIC 72 | Facility: CLINIC | Age: 80
End: 2025-03-27
Payer: OTHER GOVERNMENT

## 2025-03-27 VITALS
DIASTOLIC BLOOD PRESSURE: 68 MMHG | HEART RATE: 97 BPM | WEIGHT: 192 LBS | HEIGHT: 59 IN | SYSTOLIC BLOOD PRESSURE: 128 MMHG

## 2025-03-27 DIAGNOSIS — Q27.30 ARTERIOVENOUS MALFORMATION, SITE UNSPECIFIED: ICD-10-CM

## 2025-03-27 DIAGNOSIS — D50.9 IRON DEFICIENCY ANEMIA, UNSPECIFIED: ICD-10-CM

## 2025-03-27 DIAGNOSIS — R15.0 INCOMPLETE DEFECATION: ICD-10-CM

## 2025-03-27 DIAGNOSIS — D69.9 HEMORRHAGIC CONDITION, UNSPECIFIED: ICD-10-CM

## 2025-03-27 PROCEDURE — 99214 OFFICE O/P EST MOD 30 MIN: CPT | Performed by: NURSE PRACTITIONER

## 2025-04-26 NOTE — SERVICEHPINOTES
Davey Croft   is seen today for a follow-up visit.  
br79 year old who has been followed by [facility] and Dr. Bruner for anemia, avm, history of C diff (s/p fecal transplant) and chronic diarrheaShe has multiple medical problems including heart failure, atrial fibrillation on Apixaban, status post aortic valve replacement with bovine value, sleep apnea and very severe obesity with BMI of 50. 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 THe day prior she required a blood transfusion and her post transfusions Hgb was 9.1. She was noted to have poor compliance to medications including stopping her oral lasix 2 months prior. She also noted to be on oral vanco for C diff.Shalom saw an NP at the ID office and is due to see the ID doctor next month. SHe has been treated for C diff off and on since august and with vanco and dificid. She has done the tapering course of vancomycin that last 6 weeks. SHe feels like she was really compliant with those medications. She has diarrhea. She has it 2-3 times. There is no blood in the stool. She has a lot of arnold-anal irritation. I reviewed notes from the [facility] NP and in 10/10/2019 she had a positive c diff and was treated wtih dificid for 10 days followed by Vanco TID for with weeks.Shalom drinks squirt once or twice a week. She does drink sugar free lemonade (great value) daily. SHe drinks occasional milk, cream cheese daily.SHe has not required a blood transfusions in a year then required one in 2019. She has not had any black tarry stools, no blood in the stools. SHe does have some hemorrhoids that occasionally had specks of bright blood with wiping with the diarrhea. SHe was noted to have dropping Hgb again in january and I had her admitted for transfusion.Shalom does not have abdominal pain except with cramping before going to the bathroom.Shalom does not have heartburn.Shalom was noted to have very low hgb (on admission it was 5.9) so we decided to admit her to the hospital and transfuse her and do additional work up. enteroscopy was unrevealing. 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 She had that done.Shalom was seen by cardiology and it was decided to hold her eliquis for paroxysmal afib till she saw her cardiologist. Her hgbn on discharged was 9.4.Selena Martin has not been on the eliquis since being in the hospital. SHe saw her doctor Thursday afternoon and her hgb was 9. She doesn't want to go back on it. She is due to see Dr. Martinez on . She hasn't taken any iron or anythingInterval History:medhat is getting a puppy mix today.Mickie son  of pancreatic cancer.Shalom is having some recurrence of symptoms.brA lot of things will cause diarrhea, chocolate in any form, OJ, Cranberry juice, salads (doesn't matter the kind of dressing), sweets. She is having diarrhea once a day. She has experimented with a lactose free diet and she went back on dairy and has done OK with it.Shalom is using monk fruit sweetener every morning in tea daily.alexsandraNo abdominal pain.Shalom does feel like she has to have a BM all the time, she doesn't trust passing gasbrMara has had some fecal incontinencebrlast week her blood counts were 11.1brInterval History:1br2021?C diff negbrFC 80brNov 2 she was having a lot of trouble breathing she went to the hospital and was found to have COVID. She did not get blood in the hospital She went to [facility] after that. She was taken off her iron capsules and she got anemic while in rehab. they check her stoolbrwe got called from [facility] 2021 for blood in stool it was not visible it was microscopic.Shalom was then sent to the hospital for a blood transfusion.Shalom has had no black or tarry stools.Shalom didnt do any GI procedures. SHe had a blood transfusion in the outpatient clinic.Shalom has been back to see Dr. Ceja office since that time.brThis last monday it was 8 and then week before it was 7.Shalom started back on her routine.Shalom is not currently on any blood thinners.Sal also gave her an aranesp shot.Mickie energy is very low.Luda covid messed with her breathing.brInterval history, rsmedhat has been overall doing well and stable. She had an episode of right upper quadrant pain and was seen at [facility] ER 2022. CT abdomen and pelvis without contrast revealed cholelithiasis, nephrolithiasis and diverticulosis, no acute findings. Her hemoglobin was 8.9. When she had her labs done at Dr. Ceja office in September her hemoglobin had dropped slightly to 7.6 from 8.7. 1 month ago she states she saw a black stool on 2 occasions. She's not seen any further black stool. She sees no red blood. She has no abdominal pain, heartburn, nausea vomiting, weight loss or bowel changes.. She states she did get 2 units of packed red cells about 2 weeks ago and an iron infusion last week. She denies NSAID usage. She is not on anticoagulation.brInterval history, rSmedhat did not complete capsule endoscopy. her hemoglobin dropped again to 6.1. She was transfused 2 units on 2023. She reports her hemoglobin on 7/3/2023 was 8. 10 days ago she had melena for 2 days. Now she has occasional black stool when she wipes. She has a bowel movement every day. Some days she has the urge to go all day long. Other days she strains and cannot have a bowel movement. Her stool is not hard. She's occasional gas. She denies abdominal pain, nausea, vomiting, heartburn, weight loss.Shalom saw Dr. Martinez yesterday states her CHF is stable.Shalom picked up the medication for the coloscopy.Interval History:2023Shneda had capsule which showed multiple avm.Shalom saw her hematologist 3 weeks ago and her Hgb was 6.7 and she had to be sent to the hospital for a blood transfusioj. They started her on pantoprazole.brSweet things will give her diarrhea. stools tend towards loose. No black stool. Her stool is generally brown.Interval History:pat was supposed to get the procedures but instead was admitted with pulmonary edemabrhgb on admission was 7.7 in december it was 9 at [facility]..she got aranesp and iron brSmedhat watches the stool carefully and does not see any black stool or blood in the stool. No GI symtpoms. Shalom has chronic edema and cellucitis currently on keflex. Shalom takes lasix 60 mg in the morning and metalozone 5 mg a day. Shalom is on pantoprazole 20 mg once a day. She would like to stop it if possible.    br  Plan from last visit:
br- schedule push enteroscopy and colonoscopy at [facility]. You will need to get antibiotics prior to the procedurebr- continue follow up with [facility]br- follow up after procedures Interval History:  2024  
br enteroscopy with bleeding avm in the jejunum that I clippedbr She was doing well for some time.  br She subsequently noted black stool and hgb dropped to 5. 
br She did not take any nsaids, asa or blood thinners. 
br She was seen in [facility] and she got 4 units of blood. She had a bleeding scan that was negative.  br She has been doing well. stool is no brown and she has no bleeding. br   My nurse has reviewed and updated the medication list with the patient (medication reconciliation). I have also reviewed the medication list. New updates were made to the patient's medical, social and family history. Pertinent details are also noted above in the HPI.br visited="true"
---

## 2025-06-19 ENCOUNTER — APPOINTMENT (OUTPATIENT)
Dept: URBAN - METROPOLITAN AREA SURGERY 11 | Age: 80
Setting detail: DERMATOLOGY
End: 2025-06-24

## 2025-06-19 DIAGNOSIS — I87.2 VENOUS INSUFFICIENCY (CHRONIC) (PERIPHERAL): ICD-10-CM

## 2025-06-19 PROCEDURE — 29580 STRAPPING UNNA BOOT: CPT | Mod: 50

## 2025-06-19 PROCEDURE — OTHER PRESCRIPTION MEDICATION MANAGEMENT: OTHER

## 2025-06-19 PROCEDURE — A6443 CONFORM BAND N/S W>=3"<5"/YD: HCPCS

## 2025-06-19 PROCEDURE — OTHER ADDITIONAL NOTES: OTHER

## 2025-06-19 PROCEDURE — A6456 ZINC PASTE BAND W >=3"<5"/YD: HCPCS

## 2025-06-19 PROCEDURE — OTHER COUNSELING: OTHER

## 2025-06-19 PROCEDURE — OTHER MEDICATION COUNSELING: OTHER

## 2025-06-19 PROCEDURE — OTHER UNNA BOOT APPLICATION: OTHER

## 2025-06-19 PROCEDURE — OTHER PRESCRIPTION: OTHER

## 2025-06-19 PROCEDURE — A6453 SELF-ADHER BAND W <3"/YD: HCPCS

## 2025-06-19 RX ORDER — HYDROCORTISONE 25 MG/G
CREAM TOPICAL
Qty: 454 | Refills: 0 | Status: ERX | COMMUNITY
Start: 2025-06-19

## 2025-06-19 ASSESSMENT — LOCATION ZONE DERM: LOCATION ZONE: LEG

## 2025-06-19 ASSESSMENT — LOCATION DETAILED DESCRIPTION DERM
LOCATION DETAILED: RIGHT DISTAL PRETIBIAL REGION
LOCATION DETAILED: LEFT DISTAL PRETIBIAL REGION

## 2025-06-19 ASSESSMENT — LOCATION SIMPLE DESCRIPTION DERM
LOCATION SIMPLE: LEFT PRETIBIAL REGION
LOCATION SIMPLE: RIGHT PRETIBIAL REGION

## 2025-06-20 ENCOUNTER — APPOINTMENT (OUTPATIENT)
Dept: URBAN - METROPOLITAN AREA SURGERY 11 | Age: 80
Setting detail: DERMATOLOGY
End: 2025-06-23

## 2025-06-20 DIAGNOSIS — Z48.817 ENCOUNTER FOR SURGICAL AFTERCARE FOLLOWING SURGERY ON THE SKIN AND SUBCUTANEOUS TISSUE: ICD-10-CM

## 2025-06-20 PROCEDURE — OTHER POST-OP WOUND CHECK: OTHER

## 2025-06-20 PROCEDURE — 99024 POSTOP FOLLOW-UP VISIT: CPT

## 2025-06-20 ASSESSMENT — LOCATION DETAILED DESCRIPTION DERM: LOCATION DETAILED: LEFT DISTAL PRETIBIAL REGION

## 2025-06-20 ASSESSMENT — LOCATION SIMPLE DESCRIPTION DERM: LOCATION SIMPLE: LEFT PRETIBIAL REGION

## 2025-06-20 ASSESSMENT — LOCATION ZONE DERM: LOCATION ZONE: LEG

## 2025-06-24 ENCOUNTER — APPOINTMENT (OUTPATIENT)
Dept: URBAN - METROPOLITAN AREA SURGERY 11 | Age: 80
Setting detail: DERMATOLOGY
End: 2025-06-24

## 2025-06-24 DIAGNOSIS — I87.2 VENOUS INSUFFICIENCY (CHRONIC) (PERIPHERAL): ICD-10-CM

## 2025-06-24 PROCEDURE — OTHER UNNA BOOT APPLICATION: OTHER

## 2025-06-24 PROCEDURE — A6443 CONFORM BAND N/S W>=3"<5"/YD: HCPCS

## 2025-06-24 PROCEDURE — 29580 STRAPPING UNNA BOOT: CPT | Mod: 50

## 2025-06-24 PROCEDURE — OTHER COUNSELING: OTHER

## 2025-06-24 PROCEDURE — A6456 ZINC PASTE BAND W >=3"<5"/YD: HCPCS

## 2025-06-24 PROCEDURE — A6453 SELF-ADHER BAND W <3"/YD: HCPCS

## 2025-06-24 ASSESSMENT — LOCATION DETAILED DESCRIPTION DERM: LOCATION DETAILED: LEFT DISTAL PRETIBIAL REGION

## 2025-06-24 ASSESSMENT — LOCATION ZONE DERM: LOCATION ZONE: LEG

## 2025-06-24 ASSESSMENT — LOCATION SIMPLE DESCRIPTION DERM: LOCATION SIMPLE: LEFT PRETIBIAL REGION

## 2025-07-02 ENCOUNTER — APPOINTMENT (OUTPATIENT)
Dept: URBAN - METROPOLITAN AREA SURGERY 11 | Age: 80
Setting detail: DERMATOLOGY
End: 2025-07-02

## 2025-07-02 DIAGNOSIS — I87.2 VENOUS INSUFFICIENCY (CHRONIC) (PERIPHERAL): ICD-10-CM

## 2025-07-02 PROCEDURE — OTHER UNNA BOOT APPLICATION: OTHER

## 2025-07-14 ENCOUNTER — APPOINTMENT (OUTPATIENT)
Dept: URBAN - METROPOLITAN AREA SURGERY 11 | Age: 80
Setting detail: DERMATOLOGY
End: 2025-07-15

## 2025-07-14 DIAGNOSIS — I87.2 VENOUS INSUFFICIENCY (CHRONIC) (PERIPHERAL): ICD-10-CM

## 2025-07-14 PROCEDURE — OTHER ADDITIONAL NOTES: OTHER

## 2025-07-14 PROCEDURE — OTHER PRESCRIPTION MEDICATION MANAGEMENT: OTHER

## 2025-07-14 PROCEDURE — OTHER MEDICATION COUNSELING: OTHER

## 2025-07-14 PROCEDURE — OTHER COUNSELING: OTHER

## 2025-07-14 PROCEDURE — 99214 OFFICE O/P EST MOD 30 MIN: CPT

## 2025-07-14 RX ORDER — HYDROCORTISONE 25 MG/G
CREAM TOPICAL
Qty: 454 | Refills: 0 | Status: CANCELLED

## 2025-07-14 ASSESSMENT — LOCATION ZONE DERM: LOCATION ZONE: LEG

## 2025-07-14 ASSESSMENT — LOCATION SIMPLE DESCRIPTION DERM
LOCATION SIMPLE: RIGHT PRETIBIAL REGION
LOCATION SIMPLE: LEFT PRETIBIAL REGION

## 2025-07-14 ASSESSMENT — LOCATION DETAILED DESCRIPTION DERM
LOCATION DETAILED: RIGHT DISTAL PRETIBIAL REGION
LOCATION DETAILED: LEFT DISTAL PRETIBIAL REGION